# Patient Record
Sex: FEMALE | Race: WHITE | Employment: UNEMPLOYED | URBAN - METROPOLITAN AREA
[De-identification: names, ages, dates, MRNs, and addresses within clinical notes are randomized per-mention and may not be internally consistent; named-entity substitution may affect disease eponyms.]

---

## 2020-07-08 PROBLEM — M51.26 LUMBAR DISC HERNIATION: Status: ACTIVE | Noted: 2020-07-08

## 2020-07-08 PROBLEM — S24.153A: Status: ACTIVE | Noted: 2020-07-08

## 2020-07-14 NOTE — H&P
Department of Neurosurgery  Attending Pre-operative History and Physical        DIAGNOSIS:  Incomplete spinal cord injury, myelomalacia, decompression 2019 at [place]. INDICATION:  Attempt to improve strength in legs and further decrease spasticity to LEs. PROCEDURE:  PLACEMENT OF PERCUTANEOUS LEAD FOR TRIAL OF SPINAL CORD STIMULATOR. CHIEF COMPLAINT:  Weakness, spasticity LEs  and loss of bladder control . History Obtained From:  patient    HISTORY OF PRESENT ILLNESS:      The patient is a 78 y.o. female with significant past medical history of T10-T11 myelomalacia who presents with weakness of the LEs. Past Medical History:    No past medical history on file. Past Surgical History:    No past surgical history on file. Medications Prior to Admission:   No medications prior to admission. Allergies:  Latex; Diphenadione; Metoclopramide; Oxybutynin; Phenytoin sodium  [phenytoin]; Promethazine; Propranolol; and Sulfamethoxazole-trimethoprim  History of allergic reaction to anesthesia:  No      Social History:   TOBACCO:  Never used tobacco  Family History:   No family history on file. REVIEW OF SYSTEMS:  CONSTITUTIONAL: In no acute distress, presents in motorized wheelchair. PHYSICAL EXAM:     Eyes:  Lids and lashes normal, pupils equal, round and reactive to light, extra ocular muscles intact, sclera clear, conjunctiva normal  Reviewed H & P. No change in documentation needed as of       Head/ENT:  Normocephalic, without obvious abnormality, atraumatic, sinuses nontender on palpation, external ears without lesions, oral pharynx with moist mucus membranes, tonsils without erythema or exudates, gums normal and good dentition.     Neck:  Supple, symmetrical, trachea midline, no adenopathy, thyroid symmetric, not enlarged and no tenderness, skin normal    Heart:  Normal apical impulse, regular rate and rhythm, normal S1 and S2, no S3 or S4, and no murmur noted    Lungs:  No increased work of breathing, good air exchange, clear to auscultation bilaterally, no crackles or wheezing    Abdomen:  No scars, normal bowel sounds, soft, non-distended, non-tender, no masses palpated, no hepatosplenomegally    Extremities:  No clubbing, cyanosis, or edema. Paralysis LEs. Neurologic:  Awake, alert, oriented to name, place and time. Cranial nerves II-XII are grossly intact. Motor is 5 out of 5 bilaterally. Cerebellar finger to nose, heel to shin intact. Sensory is intact. Babinski down going, Romberg negative, and gait is normal.  EXCEPTIONS:  She is in a motorized wheelchair, however, she is able to ambulate with assistance. She states that she has no bladder control. DTRs LEs are hypereflexic, positive ankle clonus on left. 2/5 left dorsiflexion, 2/5 left quadriceps, 1-2/5 right dorsiflexion, 2/5 right quadriceps. Hip flexion is 2/3 on left and 2/5 on right. Spasticity is 3-4+ on left and 4/5 on right. Sensations are diminished by 80% to right thigh. Left thigh diminished by 90%. At T-12 she had full sensation on right and at L-1 on left she has full sensation. DATA:  Radiology Review:  Recent MRIs of thoracic and lumbar areas. ASSESSMENT AND PLAN:    1. Patient is a 78 y.o. female with above specified procedure planned 7/30/20 with conscious sedation  2. Procedure options, risks and benefits reviewed with patient. Patient expresses understanding. Family member present for entire visit and participated in formal informed consent process. Pt. Has opted for trial of SCS. Will schedule electively. Reviewed H & P.  No change in documentation needed as of 7/30/2020

## 2020-07-24 ENCOUNTER — HOSPITAL ENCOUNTER (OUTPATIENT)
Age: 70
Discharge: HOME | End: 2020-07-26
Payer: MEDICARE

## 2020-07-24 PROCEDURE — U0003 INFECTIOUS AGENT DETECTION BY NUCLEIC ACID (DNA OR RNA); SEVERE ACUTE RESPIRATORY SYNDROME CORONAVIRUS 2 (SARS-COV-2) (CORONAVIRUS DISEASE [COVID-19]), AMPLIFIED PROBE TECHNIQUE, MAKING USE OF HIGH THROUGHPUT TECHNOLOGIES AS DESCRIBED BY CMS-2020-01-R: HCPCS

## 2020-07-24 SDOH — HEALTH STABILITY: MENTAL HEALTH: HOW OFTEN DO YOU HAVE A DRINK CONTAINING ALCOHOL?: NEVER

## 2020-07-27 LAB
SARS-COV-2: NOT DETECTED
SOURCE: NORMAL

## 2020-07-28 ENCOUNTER — HOSPITAL ENCOUNTER (OUTPATIENT)
Age: 70
Setting detail: OUTPATIENT SURGERY
Discharge: HOME | End: 2020-07-30
Attending: NEUROLOGICAL SURGERY | Admitting: NEUROLOGICAL SURGERY
Payer: MEDICARE

## 2020-07-30 ENCOUNTER — ANESTHESIA EVENT (OUTPATIENT)
Dept: OPERATING ROOM | Age: 70
End: 2020-07-30
Payer: MEDICARE

## 2020-07-30 ENCOUNTER — APPOINTMENT (OUTPATIENT)
Dept: GENERAL RADIOLOGY | Age: 70
End: 2020-07-30
Attending: NEUROLOGICAL SURGERY
Payer: MEDICARE

## 2020-07-30 ENCOUNTER — ANESTHESIA (OUTPATIENT)
Dept: OPERATING ROOM | Age: 70
End: 2020-07-30
Payer: MEDICARE

## 2020-07-30 VITALS
RESPIRATION RATE: 12 BRPM | DIASTOLIC BLOOD PRESSURE: 77 MMHG | OXYGEN SATURATION: 100 % | SYSTOLIC BLOOD PRESSURE: 151 MMHG

## 2020-07-30 VITALS
TEMPERATURE: 97.1 F | SYSTOLIC BLOOD PRESSURE: 146 MMHG | OXYGEN SATURATION: 98 % | BODY MASS INDEX: 24.16 KG/M2 | DIASTOLIC BLOOD PRESSURE: 66 MMHG | HEART RATE: 66 BPM | HEIGHT: 65 IN | WEIGHT: 145 LBS | RESPIRATION RATE: 16 BRPM

## 2020-07-30 LAB
METER GLUCOSE: 104 MG/DL (ref 74–99)
METER GLUCOSE: 91 MG/DL (ref 74–99)

## 2020-07-30 PROCEDURE — 3600000013 HC SURGERY LEVEL 3 ADDTL 15MIN: Performed by: NEUROLOGICAL SURGERY

## 2020-07-30 PROCEDURE — 2709999900 HC NON-CHARGEABLE SUPPLY: Performed by: NEUROLOGICAL SURGERY

## 2020-07-30 PROCEDURE — 6360000002 HC RX W HCPCS: Performed by: NEUROLOGICAL SURGERY

## 2020-07-30 PROCEDURE — 3600000003 HC SURGERY LEVEL 3 BASE: Performed by: NEUROLOGICAL SURGERY

## 2020-07-30 PROCEDURE — 2500000003 HC RX 250 WO HCPCS: Performed by: NEUROLOGICAL SURGERY

## 2020-07-30 PROCEDURE — 3700000000 HC ANESTHESIA ATTENDED CARE: Performed by: NEUROLOGICAL SURGERY

## 2020-07-30 PROCEDURE — 2580000003 HC RX 258

## 2020-07-30 PROCEDURE — 7100000010 HC PHASE II RECOVERY - FIRST 15 MIN: Performed by: NEUROLOGICAL SURGERY

## 2020-07-30 PROCEDURE — 6360000002 HC RX W HCPCS: Performed by: NURSE ANESTHETIST, CERTIFIED REGISTERED

## 2020-07-30 PROCEDURE — 2580000003 HC RX 258: Performed by: NEUROLOGICAL SURGERY

## 2020-07-30 PROCEDURE — 2720000010 HC SURG SUPPLY STERILE: Performed by: NEUROLOGICAL SURGERY

## 2020-07-30 PROCEDURE — 7100000011 HC PHASE II RECOVERY - ADDTL 15 MIN: Performed by: NEUROLOGICAL SURGERY

## 2020-07-30 PROCEDURE — C1897 LEAD, NEUROSTIM TEST KIT: HCPCS | Performed by: NEUROLOGICAL SURGERY

## 2020-07-30 PROCEDURE — 3700000001 HC ADD 15 MINUTES (ANESTHESIA): Performed by: NEUROLOGICAL SURGERY

## 2020-07-30 PROCEDURE — 3209999900 FLUORO FOR SURGICAL PROCEDURES

## 2020-07-30 PROCEDURE — 82962 GLUCOSE BLOOD TEST: CPT

## 2020-07-30 DEVICE — KIT LD L60CM 1X8 COMP TRL SCRN FOR SACR NRV STIM VECTRIS: Type: IMPLANTABLE DEVICE | Site: BACK | Status: FUNCTIONAL

## 2020-07-30 RX ORDER — FENTANYL CITRATE 50 UG/ML
INJECTION, SOLUTION INTRAMUSCULAR; INTRAVENOUS PRN
Status: DISCONTINUED | OUTPATIENT
Start: 2020-07-30 | End: 2020-07-30 | Stop reason: SDUPTHER

## 2020-07-30 RX ORDER — MIDAZOLAM HYDROCHLORIDE 1 MG/ML
INJECTION INTRAMUSCULAR; INTRAVENOUS PRN
Status: DISCONTINUED | OUTPATIENT
Start: 2020-07-30 | End: 2020-07-30 | Stop reason: SDUPTHER

## 2020-07-30 RX ORDER — PROPOFOL 10 MG/ML
INJECTION, EMULSION INTRAVENOUS CONTINUOUS PRN
Status: DISCONTINUED | OUTPATIENT
Start: 2020-07-30 | End: 2020-07-30 | Stop reason: SDUPTHER

## 2020-07-30 RX ORDER — SODIUM CHLORIDE 0.9 % (FLUSH) 0.9 %
10 SYRINGE (ML) INJECTION PRN
Status: DISCONTINUED | OUTPATIENT
Start: 2020-07-30 | End: 2020-07-30 | Stop reason: HOSPADM

## 2020-07-30 RX ORDER — LIDOCAINE HYDROCHLORIDE 20 MG/ML
INJECTION, SOLUTION INTRAVENOUS PRN
Status: DISCONTINUED | OUTPATIENT
Start: 2020-07-30 | End: 2020-07-30 | Stop reason: SDUPTHER

## 2020-07-30 RX ORDER — SODIUM CHLORIDE 0.9 % (FLUSH) 0.9 %
10 SYRINGE (ML) INJECTION EVERY 12 HOURS SCHEDULED
Status: DISCONTINUED | OUTPATIENT
Start: 2020-07-30 | End: 2020-07-30 | Stop reason: HOSPADM

## 2020-07-30 RX ORDER — SODIUM CHLORIDE 9 MG/ML
INJECTION, SOLUTION INTRAVENOUS CONTINUOUS PRN
Status: DISCONTINUED | OUTPATIENT
Start: 2020-07-30 | End: 2020-07-30 | Stop reason: SDUPTHER

## 2020-07-30 RX ORDER — LIDOCAINE HYDROCHLORIDE AND EPINEPHRINE 10; 10 MG/ML; UG/ML
INJECTION, SOLUTION INFILTRATION; PERINEURAL PRN
Status: DISCONTINUED | OUTPATIENT
Start: 2020-07-30 | End: 2020-07-30 | Stop reason: ALTCHOICE

## 2020-07-30 RX ADMIN — PROPOFOL 15 MCG/KG/MIN: 10 INJECTION, EMULSION INTRAVENOUS at 07:39

## 2020-07-30 RX ADMIN — FENTANYL CITRATE 25 MCG: 50 INJECTION, SOLUTION INTRAMUSCULAR; INTRAVENOUS at 07:50

## 2020-07-30 RX ADMIN — FENTANYL CITRATE 25 MCG: 50 INJECTION, SOLUTION INTRAMUSCULAR; INTRAVENOUS at 07:40

## 2020-07-30 RX ADMIN — FENTANYL CITRATE 25 MCG: 50 INJECTION, SOLUTION INTRAMUSCULAR; INTRAVENOUS at 07:34

## 2020-07-30 RX ADMIN — SODIUM CHLORIDE: 9 INJECTION, SOLUTION INTRAVENOUS at 06:01

## 2020-07-30 RX ADMIN — CEFAZOLIN 1 G: 1 INJECTION, POWDER, FOR SOLUTION INTRAMUSCULAR; INTRAVENOUS at 07:40

## 2020-07-30 RX ADMIN — MIDAZOLAM 1 MG: 1 INJECTION INTRAMUSCULAR; INTRAVENOUS at 07:34

## 2020-07-30 RX ADMIN — FENTANYL CITRATE 25 MCG: 50 INJECTION, SOLUTION INTRAMUSCULAR; INTRAVENOUS at 07:48

## 2020-07-30 RX ADMIN — LIDOCAINE HYDROCHLORIDE 40 MG: 20 INJECTION, SOLUTION INTRAVENOUS at 07:38

## 2020-07-30 RX ADMIN — MIDAZOLAM 1 MG: 1 INJECTION INTRAMUSCULAR; INTRAVENOUS at 07:30

## 2020-07-30 ASSESSMENT — PAIN SCALES - GENERAL
PAINLEVEL_OUTOF10: 0

## 2020-07-30 ASSESSMENT — PAIN - FUNCTIONAL ASSESSMENT: PAIN_FUNCTIONAL_ASSESSMENT: 0-10

## 2020-07-30 NOTE — OP NOTE
[address]                  [address] [facility],  [facility]                                OPERATIVE REPORT    PATIENT NAME: Hanna Moore                      :        1950  MED REC NO:   [id number]                            ROOM:  ACCOUNT NO:   [de-identified]                           ADMIT DATE: 2020  PROVIDER:     Herminio Cardona MD    DATE OF PROCEDURE:  2020    SURGEON:  Herminio Cardona MD    PREOPERATIVE DIAGNOSIS:  Intractable pain secondary to incomplete spinal  cord injury. POSTOPERATIVE DIAGNOSIS:  Intractable pain secondary to incomplete  spinal cord injury. PROCEDURE:  Insert Vectris spinal cord stimulator trial lead from  [employer]. TECHNIQUE:  The patient was placed on the operating room table in the  supine position and after satisfactory monitoring had been instituted by  the anesthesia department and the patient was sedated to some extent,  the patient was turned into prone position on the operating room table. The lower back and the mid back were prepared with Betadine scrub and  solution and routinely draped. A point, which was 2 cm from the midline on the left side at the level  of T12 was infiltrated with 1% lidocaine solution. Through this, a  Tuohy needle from the spinal cord stimulator trial kit was inserted  under fluoroscopic guidance into the epidural space at T10-T11. Once  the needle was lying the epidural space, the Stylet was removed and the  Vectris spinal cord stimulator lead was inserted and advanced to the  level of T8 _____ right on the midline. At this stage, the lead was  connected to the stimulator and the patient was allowed to wake up and  was asked several questions pertaining to the paresthesias in the lower  extremity. The patient felt the paresthesia did distribute in her pain. At this stage, the needle was removed and the Stylet of the lead was  removed.   The lead was anchored to the back with 3-0 nylon suture and  OpSite and dry dressing was applied over the incision area. The patient  was sent to recovery room in satisfactory state. There was no blood  loss. There was no specimen.         Luis Vizcaino MD    D: 07/30/2020 8:27:07       T: 07/30/2020 9:17:55     [email or web address]  Job#: [id number]     Doc#: [id number]    CC:

## 2020-07-30 NOTE — PROGRESS NOTES
Admitted to Same Day Surgery. Preop instructions given to patient. COVID testing completed on: 7/24/20  Results of the test: not detected  The patient verbally confirms that they did adhere to the self-quarantine guidelines. No signs or symptoms expressed or observed.
Discharge instructions reviewed with patient and her , both acknowledge understanding.
Patient made aware that covid test needs done today and to self isolate until surgery, going to Bd site.
blood sugar [phone]. [x] Use your inhalers the morning of surgery. Bring your emergency inhaler with you day of surgery. [] Follow physician instructions regarding any blood thinners you may be taking. none    WHAT TO EXPECT:  [x] The day of surgery you will be greeted and checked in by the [employer].  In addition, you will be registered in the [facility] by a Patient Access Representative. Please bring your photo ID and insurance card. A nurse will greet you in accordance to the time you are needed in the pre-op area to prepare you for surgery. Please do not be discouraged if you are not greeted in the order you arrive as there are many variables that are involved in patient preparation. Your patience is greatly appreciated as you wait for your nurse. Please bring in items such as: books, magazines, newspapers, electronics, or any other items  to occupy your time in the waiting area. []  Delays may occur with surgery and staff will make a sincere effort to keep you informed of delays. If any delays occur with your procedure, we apologize ahead of time for your inconvenience as we recognize the value of your time.

## 2020-07-30 NOTE — ANESTHESIA PRE PROCEDURE
Department of Anesthesiology  Preprocedure Note       Name:  Henry Btaes   Age:  78 y.o.  :  1950                                          MRN:  [id number]         Date:  2020      Surgeon: Danelle Pimentel):  Lucy Lopez MD    Procedure: Procedure(s):  PLACEMENT OF PERCUTANEOUS LEAD FOR SPINAL CORD STIMULATOR TRIAL -- [employer]    Medications prior to admission:   Prior to Admission medications    Medication Sig Start Date End Date Taking?  Authorizing Provider   Probiotic Product (PROBIOTIC DAILY PO) Take by mouth   Yes Historical Provider, MD   baclofen (LIORESAL) 10 MG tablet 4 times daily    Yes Historical Provider, MD   sertraline (ZOLOFT) 50 MG tablet 100 mg nightly  9/3/10  Yes Historical Provider, MD   montelukast (SINGULAIR) 10 MG tablet SIG once a day in the evening 9/3/10  Yes Historical Provider, MD   simvastatin (ZOCOR) 80 MG tablet 1 tablet in the evening   Yes Historical Provider, MD   risperiDONE (RISPERDAL) 0.25 MG tablet every evening    Yes Historical Provider, MD   amitriptyline (ELAVIL) 25 MG tablet SIG once a day at bedtime 9/3/10  Yes Historical Provider, MD   clonazePAM (KLONOPIN) 0.5 MG tablet SIG 3 times a day 9/3/10  Yes Historical Provider, MD   lisinopril (PRINIVIL;ZESTRIL) 2.5 MG tablet 1 tablet   Yes Historical Provider, MD   metFORMIN (GLUCOPHAGE) 500 MG tablet 2 times daily (with meals)    Yes Historical Provider, MD   guaiFENesin (MUCINEX) 600 MG extended release tablet 1 tablet as needed   Yes Historical Provider, MD   albuterol (PROVENTIL) (2.5 MG/3ML) 0.083% nebulizer solution 5 ml    Historical Provider, MD   AZITHROMYCIN PO     Historical Provider, MD   ondansetron (ZOFRAN) 4 MG tablet 1 tablet    Historical Provider, MD   omeprazole (PRILOSEC) 20 MG delayed release capsule 1 tablet 30 minutes before a meal    Historical Provider, MD   desloratadine (CLARINEX) 5 MG tablet SIG          PRNas needed for allergy symptoms 9/3/10   Historical Provider, MD albuterol-ipratropium (COMBIVENT RESPIMAT)  MCG/ACT AERS inhaler 1 puff as needed    Historical Provider, MD   dicyclomine (BENTYL) 10 MG capsule as needed     Historical Provider, MD   IPRATROPIUM BROMIDE IN Inhale into the lungs as needed     Historical Provider, MD   levoFLOXacin (LEVAQUIN) 500 MG tablet as needed     Historical Provider, MD   Multiple Vitamins-Minerals (MULTIVITAMIN ADULT PO)     Historical Provider, MD       Current medications:    Current Facility-Administered Medications   Medication Dose Route Frequency Provider Last Rate Last Dose    sodium chloride flush 0.9 % injection 10 mL  10 mL Intravenous 2 times per day Serenity Brooks MD        sodium chloride flush 0.9 % injection 10 mL  10 mL Intravenous PRN Serenity Brooks MD        ceFAZolin (ANCEF) 1 g in sterile water 10 mL IV syringe  1 g Intravenous Once Serenity Brooks MD         Facility-Administered Medications Ordered in Other Encounters   Medication Dose Route Frequency Provider Last Rate Last Dose    0.9 % sodium chloride infusion    Continuous PRN Saran Montes RN           Allergies: Allergies   Allergen Reactions    Latex Swelling and Other (See Comments)     Per dentist    Oxybutynin Other (See Comments)     Pass out    Diphenatol [Diphenoxylate-Atropine] Rash    Morphine      hallucinate    Promethazine       blurred vision    Ciprofloxacin      ?     Diphenadione Other (See Comments)    Metoclopramide Other (See Comments)     reglan  Skin crawling    Phenytoin Sodium [Phenytoin] Other (See Comments)    Propranolol Other (See Comments)     Inderal  Skin crawl    Sulfamethoxazole-Trimethoprim      Bactrim  Made depression worse       Problem List:    Patient Active Problem List   Diagnosis Code    Functional injury at T10-T11 level of thoracic spinal cord ([facility] [address] S24.153A    Lumbar disc herniation M51.26       Past Medical History:        Diagnosis Date    COPD (chronic obstructive pulmonary disease) ([facility] [address]     Depression     [place] [facility]    Diabetes mellitus ([facility] [address]     Hemiplegia ([facility] [address]     History of tremor     right hand    Hyperlipidemia     Hypertension     Self-catheterizes urinary bladder     every 6 hours       Past Surgical History:        Procedure Laterality Date    BACK SURGERY  2019    [facility] T10-11    BLADDER SURGERY      CHOLECYSTECTOMY      COLONOSCOPY      HYSTERECTOMY         Social History:    Social History     Tobacco Use    Smoking status: Never Smoker    Smokeless tobacco: Never Used   Substance Use Topics    Alcohol use: Never     Frequency: Never                                Counseling given: Not Answered      Vital Signs (Current):   Vitals:    07/24/20 1153 07/24/20 1233 07/30/20 0622 07/30/20 0627   BP:    139/66   Pulse:    62   Resp:    15   Temp:    36.3 °C (97.3 °F)   TempSrc:    Temporal   SpO2:    97%   Weight: 145 lb (65.8 kg) 145 lb (65.8 kg) 145 lb (65.8 kg)    Height: 5' 5\" (1.651 m) 5' 5\" (1.651 m) 5' 5\" (1.651 m)                                               BP Readings from Last 3 Encounters:   07/30/20 139/66       NPO Status: Time of last liquid consumption: 2000                        Time of last solid consumption: 2000                        Date of last liquid consumption: 07/29/20                        Date of last solid food consumption: 07/29/20    BMI:   Wt Readings from Last 3 Encounters:   07/30/20 145 lb (65.8 kg)     Body mass index is 24.13 kg/m². CBC: No results found for: WBC, RBC, HGB, HCT, MCV, RDW, PLT    CMP: No results found for: NA, K, CL, CO2, BUN, CREATININE, GFRAA, AGRATIO, LABGLOM, GLUCOSE, PROT, CALCIUM, BILITOT, ALKPHOS, AST, ALT    POC Tests: No results for input(s): POCGLU, POCNA, POCK, POCCL, POCBUN, POCHEMO, POCHCT in the last 72 hours.     Coags: No results found for: PROTIME, INR, APTT    HCG (If Applicable): No results found for: PREGTESTUR, PREGSERUM, HCG, HCGQUANT     ABGs: No results found for: PHART, PO2ART, GQZ1GNF, FUF5ZYS, BEART, L7CJYNSO     Type & Screen (If Applicable):  No results found for: LABABO, LABRH    Drug/Infectious Status (If Applicable):  No results found for: HIV, HEPCAB    COVID-19 Screening (If Applicable):   Lab Results   Component Value Date    COVID19 Not Detected 07/24/2020         Anesthesia Evaluation  Patient summary reviewed and Nursing notes reviewed  Airway: Mallampati: II  TM distance: >3 FB   Neck ROM: full  Mouth opening: > = 3 FB Dental: normal exam     Comment: Patient denies any cracked, chipped, or loose teeth    Pulmonary: breath sounds clear to auscultation  (+) COPD:  asthma:                            Cardiovascular:  Exercise tolerance: good (>4 METS),   (+) hypertension:, hyperlipidemia      NYHA Classification: II    Rhythm: regular  Rate: normal                    Neuro/Psych:   (+) neuromuscular disease:, psychiatric history:             ROS comment: Right left paresthesia GI/Hepatic/Renal:   (+) GERD: well controlled, renal disease:,          ROS comment: Self-catheterization q6 hours. Endo/Other:    (+) DiabetesType II DM, well controlled, , : arthritis:., .                 Abdominal:   (+) obese,     Abdomen: soft. Vascular: negative vascular ROS. Anesthesia Plan      MAC     ASA 3     (20 gauge left forearm)  Induction: intravenous. MIPS: Postoperative opioids intended. Anesthetic plan and risks discussed with patient. Use of blood products discussed with patient whom consented to blood products. Plan discussed with CRNA.                   Deirdre Bertrand RN   7/30/2020

## 2020-07-30 NOTE — BRIEF OP NOTE
Brief Postoperative Note      Patient: Jada Nieves  YOB: 1950  MRN: [id number]    Date of Procedure: 7/30/2020    Pre-Op Diagnosis: INCOMPLETE SPINAL CORD INJURY    Post-Op Diagnosis: Same       Procedure(s):  PLACEMENT OF PERCUTANEOUS LEAD FOR SPINAL CORD STIMULATOR TRIAL --T8 - [employer]    Surgeon(s):  Sohan Murray MD    Assistant:  * No surgical staff found *    Anesthesia: Monitor Anesthesia Care    Estimated Blood Loss (mL): none    Complications: None    Specimens:   * No specimens in log *    Implants:  Implant Name Type Inv.  Item Serial No.  Lot No. LRB No. Used Action   LEAD TRIAL COMPCT PERC 1X8 Spine LEAD TRIAL COMPCT [id number] [id number] N/A 1 Implanted         Drains: * No LDAs found *    Findings: As expected    Electronically signed by Sohan Murray MD on 7/30/2020 at 8:16 AM

## 2020-07-30 NOTE — ANESTHESIA POSTPROCEDURE EVALUATION
Department of Anesthesiology  Postprocedure Note    Patient: Silvia Morris  MRN: [id number]  YOB: 1950  Date of evaluation: 7/30/2020  Time:  8:42 AM     Procedure Summary     Date:  07/30/20 Room / Location:  [facility] OR  / [facility]    Anesthesia Start:  4741 Anesthesia Stop:  0774    Procedure:  PLACEMENT OF PERCUTANEOUS LEAD FOR SPINAL CORD STIMULATOR TRIAL -- [employer] (N/A Back) Diagnosis:  (INCOMPLETE SPINAL CORD INJURY)    Surgeon:  Pa Loyd MD Responsible Provider:  Enid Kohler MD    Anesthesia Type:  MAC ASA Status:  3          Anesthesia Type: MAC    Chloe Phase I: Chloe Score: 10    Chloe Phase II:      Last vitals: Reviewed and per EMR flowsheets.        Anesthesia Post Evaluation    Patient location during evaluation: PACU  Patient participation: complete - patient participated  Level of consciousness: awake and alert  Pain score: 0  Airway patency: patent  Nausea & Vomiting: no vomiting and no nausea  Complications: no  Cardiovascular status: hemodynamically stable  Respiratory status: acceptable  Hydration status: stable

## 2020-08-04 ENCOUNTER — HOSPITAL ENCOUNTER (OUTPATIENT)
Dept: GENERAL RADIOLOGY | Age: 70
Discharge: HOME | End: 2020-08-06
Payer: MEDICARE

## 2020-08-04 ENCOUNTER — HOSPITAL ENCOUNTER (OUTPATIENT)
Age: 70
Discharge: HOME | End: 2020-08-06
Payer: MEDICARE

## 2020-08-04 PROBLEM — G89.29 CHRONIC THORACIC BACK PAIN: Status: ACTIVE | Noted: 2020-08-04

## 2020-08-04 PROBLEM — M54.6 CHRONIC THORACIC BACK PAIN: Status: ACTIVE | Noted: 2020-08-04

## 2020-08-04 PROCEDURE — 72070 X-RAY EXAM THORAC SPINE 2VWS: CPT

## (undated) DEVICE — BOOT NERVE STIM EXT WIRELESS ACCSRY PAIN ENS

## (undated) DEVICE — 3M™ STERI-DRAPE™ INCISE DRAPE 1050 (60CM X 45CM): Brand: STERI-DRAPE™

## (undated) DEVICE — GLOVE SURG SZ 65 THK91MIL LTX FREE SYN POLYISOPRENE

## (undated) DEVICE — CLOTH SURG PREP PREOPERATIVE CHLORHEXIDINE GLUC 2% READYPREP

## (undated) DEVICE — 1000 S-DRAPE TOWEL DRAPE 10/BX: Brand: STERI-DRAPE™

## (undated) DEVICE — SWABSTICK SURG PREP BENZOIN TINCTURE SINGLE ST

## (undated) DEVICE — DRAPE CAM W7XL96IN W/ BLU KRATON TIP FOR LSR VID

## (undated) DEVICE — APPLICATOR PREP 26ML 0.7% IOD POVACRYLEX 74% ISO ALC ST

## (undated) DEVICE — GAUZE,SPONGE,4"X4",16PLY,XRAY,STRL,LF: Brand: MEDLINE

## (undated) DEVICE — GOWN,SIRUS,FABRNF,L,20/CS: Brand: MEDLINE

## (undated) DEVICE — TUBING, SUCTION, 3/16" X 12', STRAIGHT: Brand: MEDLINE

## (undated) DEVICE — SET SPINAL NEURO STNEU1

## (undated) DEVICE — DRAPE C ARM UNIV W41XL74IN CLR PLAS XR VELC CLSR POLY STRP

## (undated) DEVICE — SURGICAL PROCEDURE PACK BASIC

## (undated) DEVICE — LABEL MED 4 IN SURG PANEL W/ PEN STRL

## (undated) DEVICE — DEVICE NEUROSTIMULATOR W2.9XL3.1IN THK0.8IN 71GM

## (undated) DEVICE — DECANTER BAG 9": Brand: MEDLINE INDUSTRIES, INC.

## (undated) DEVICE — BLADE CLIPPER GEN PURP NS

## (undated) DEVICE — PATIENT RETURN ELECTRODE, SINGLE-USE, CONTACT QUALITY MONITORING, ADULT, WITH 9FT CORD, FOR PATIENTS WEIGING OVER 33LBS. (15KG): Brand: MEGADYNE

## (undated) DEVICE — 3M(TM) MEDIPORE(TM) +PAD SOFT CLOTH ADHESIVE WOUND DRESSING 3569: Brand: 3M™ MEDIPORE™